# Patient Record
Sex: MALE | Race: WHITE | URBAN - METROPOLITAN AREA
[De-identification: names, ages, dates, MRNs, and addresses within clinical notes are randomized per-mention and may not be internally consistent; named-entity substitution may affect disease eponyms.]

---

## 2018-08-13 NOTE — PATIENT DISCUSSION
(U64.929) Vitreous degeneration, left eye - Assesment : Examination revealed a posterior vitreous detachment. - Plan : Handouts given on posterior vitreous detachment and risk factors discussed for retinal detachment development. Advised to call immediately with any changes.

## 2018-08-13 NOTE — PATIENT DISCUSSION
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 Confirmed by OCT mac today. - Plan : Monitor for changes. Advised patient to call our office with decreased vision or increased distortion. Refer to retina for evaluation and treatment.

## 2019-06-03 ENCOUNTER — IMPORTED ENCOUNTER (OUTPATIENT)
Dept: URBAN - METROPOLITAN AREA CLINIC 50 | Facility: CLINIC | Age: 69
End: 2019-06-03

## 2019-06-24 ENCOUNTER — IMPORTED ENCOUNTER (OUTPATIENT)
Dept: URBAN - METROPOLITAN AREA CLINIC 50 | Facility: CLINIC | Age: 69
End: 2019-06-24

## 2019-07-08 ENCOUNTER — IMPORTED ENCOUNTER (OUTPATIENT)
Dept: URBAN - METROPOLITAN AREA CLINIC 50 | Facility: CLINIC | Age: 69
End: 2019-07-08

## 2019-08-20 NOTE — PATIENT DISCUSSION
(A09.4528) Nonexudative age-related macular degeneration right eye zuhair - Assesment : Examination revealed  dry age-related macular degeneration. Pt does not consistent with taking ARED[employer] vitamins. - Plan : Continue [employer] grid use and [employer] vitamins on a regular basis. Recommended the use of sunglasses with UV protection. STROKE TEAM ACTIVATED/1103

## 2021-06-14 ASSESSMENT — VISUAL ACUITY
OD_CC: J1+
OD_SC: 20/25-1
OD_OTHER: 20/70. >20/400.
OS_SC: 20/30-1
OD_BAT: 20/70
OS_BAT: 20/100
OD_SC: 20/40
OD_CC: J1+
OS_CC: J1+
OD_PH: 20/25-2
OS_SC: 20/30
OS_CC: J1+
OS_OTHER: 20/100. >20/400.
OS_SC: 20/30+2
OD_SC: 20/30+

## 2021-06-14 ASSESSMENT — TONOMETRY
OS_IOP_MMHG: 18
OD_IOP_MMHG: 15
OS_IOP_MMHG: 15
OS_IOP_MMHG: 16
OD_IOP_MMHG: 17
OD_IOP_MMHG: 16
OS_IOP_MMHG: 20
OD_IOP_MMHG: 16

## 2021-06-14 ASSESSMENT — PACHYMETRY
OD_CT_UM: 517
OS_CT_UM: 513